# Patient Record
Sex: FEMALE | URBAN - METROPOLITAN AREA
[De-identification: names, ages, dates, MRNs, and addresses within clinical notes are randomized per-mention and may not be internally consistent; named-entity substitution may affect disease eponyms.]

---

## 2018-03-30 ENCOUNTER — APPOINTMENT (RX ONLY)
Dept: URBAN - METROPOLITAN AREA CLINIC 71 | Facility: CLINIC | Age: 73
Setting detail: DERMATOLOGY
End: 2018-03-30

## 2018-03-30 VITALS — HEIGHT: 65 IN | WEIGHT: 174 LBS

## 2018-03-30 DIAGNOSIS — B07.8 OTHER VIRAL WARTS: ICD-10-CM

## 2018-03-30 PROBLEM — C44.519 BASAL CELL CARCINOMA OF SKIN OF OTHER PART OF TRUNK: Status: ACTIVE | Noted: 2018-03-30

## 2018-03-30 PROBLEM — E13.9 OTHER SPECIFIED DIABETES MELLITUS WITHOUT COMPLICATIONS: Status: ACTIVE | Noted: 2018-03-30

## 2018-03-30 PROBLEM — F41.9 ANXIETY DISORDER, UNSPECIFIED: Status: ACTIVE | Noted: 2018-03-30

## 2018-03-30 PROBLEM — N18.9 CHRONIC KIDNEY DISEASE, UNSPECIFIED: Status: ACTIVE | Noted: 2018-03-30

## 2018-03-30 PROBLEM — E78.5 HYPERLIPIDEMIA, UNSPECIFIED: Status: ACTIVE | Noted: 2018-03-30

## 2018-03-30 PROBLEM — I10 ESSENTIAL (PRIMARY) HYPERTENSION: Status: ACTIVE | Noted: 2018-03-30

## 2018-03-30 PROBLEM — Z85.828 PERSONAL HISTORY OF OTHER MALIGNANT NEOPLASM OF SKIN: Status: ACTIVE | Noted: 2018-03-30

## 2018-03-30 PROCEDURE — ? LIQUID NITROGEN

## 2018-03-30 PROCEDURE — 12032 INTMD RPR S/A/T/EXT 2.6-7.5: CPT | Mod: 59

## 2018-03-30 PROCEDURE — ? EXCISION

## 2018-03-30 PROCEDURE — 11600 EXC TR-EXT MAL+MARG 0.5 CM/<: CPT

## 2018-03-30 PROCEDURE — ? COUNSELING

## 2018-03-30 PROCEDURE — 17110 DESTRUCTION B9 LES UP TO 14: CPT

## 2018-03-30 ASSESSMENT — LOCATION ZONE DERM: LOCATION ZONE: ARM

## 2018-03-30 ASSESSMENT — LOCATION DETAILED DESCRIPTION DERM: LOCATION DETAILED: LEFT PROXIMAL DORSAL FOREARM

## 2018-03-30 ASSESSMENT — LOCATION SIMPLE DESCRIPTION DERM: LOCATION SIMPLE: LEFT FOREARM

## 2018-03-30 NOTE — PROCEDURE: EXCISION
Body Location Override (Optional - Billing Will Still Be Based On Selected Body Map Location If Applicable): Right Shoulder

## 2018-04-20 ENCOUNTER — APPOINTMENT (RX ONLY)
Dept: URBAN - METROPOLITAN AREA CLINIC 71 | Facility: CLINIC | Age: 73
Setting detail: DERMATOLOGY
End: 2018-04-20

## 2018-04-20 DIAGNOSIS — B07.8 OTHER VIRAL WARTS: ICD-10-CM

## 2018-04-20 DIAGNOSIS — Z48.02 ENCOUNTER FOR REMOVAL OF SUTURES: ICD-10-CM

## 2018-04-20 PROBLEM — D04.39 CARCINOMA IN SITU OF SKIN OF OTHER PARTS OF FACE: Status: ACTIVE | Noted: 2018-04-20

## 2018-04-20 PROCEDURE — 17110 DESTRUCTION B9 LES UP TO 14: CPT

## 2018-04-20 PROCEDURE — 99024 POSTOP FOLLOW-UP VISIT: CPT

## 2018-04-20 PROCEDURE — ? SUTURE REMOVAL (GLOBAL PERIOD)

## 2018-04-20 PROCEDURE — ? COUNSELING

## 2018-04-20 PROCEDURE — ? LIQUID NITROGEN

## 2018-04-20 PROCEDURE — 99212 OFFICE O/P EST SF 10 MIN: CPT | Mod: 25

## 2018-04-20 PROCEDURE — ? PRESCRIPTION

## 2018-04-20 RX ORDER — IMIQUIMOD 50 MG/G
CREAM TOPICAL
Qty: 1 | Refills: 0 | Status: ERX | COMMUNITY
Start: 2018-04-20

## 2018-04-20 RX ADMIN — IMIQUIMOD SMALL AMOUNT: 50 CREAM TOPICAL at 00:00

## 2018-04-20 ASSESSMENT — LOCATION ZONE DERM: LOCATION ZONE: ARM

## 2018-04-20 ASSESSMENT — LOCATION SIMPLE DESCRIPTION DERM
LOCATION SIMPLE: RIGHT SHOULDER
LOCATION SIMPLE: LEFT FOREARM

## 2018-04-20 ASSESSMENT — LOCATION DETAILED DESCRIPTION DERM
LOCATION DETAILED: LEFT PROXIMAL DORSAL FOREARM
LOCATION DETAILED: RIGHT ANTERIOR SHOULDER

## 2018-04-20 NOTE — PROCEDURE: SUTURE REMOVAL (GLOBAL PERIOD)
Add 77308 Cpt? (Important Note: In 2017 The Use Of 00183 Is Being Tracked By Cms To Determine Future Global Period Reimbursement For Global Periods): yes
Body Location Override (Optional - Billing Will Still Be Based On Selected Body Map Location If Applicable): right shoulder
Detail Level: Detailed

## 2018-04-20 NOTE — HPI: FOLLOW-UP
What Is The Condition That You Are Returning For Follow-Up?: warts
When Was Your Last Appointment?: 03/30/2018
During The Previous Visit, The Patient....: Was treated with liquid nitrogen

## 2018-04-20 NOTE — PROCEDURE: LIQUID NITROGEN
Medical Necessity Clause: This procedure was medically necessary because the lesions that were treated were:
Duration Of Freeze Thaw-Cycle (Seconds): 15-20
Post-Care Instructions: I reviewed with the patient in detail post-care instructions. Patient is to wear sunprotection, and avoid picking at any of the treated lesions. Pt may apply Vaseline to crusted or scabbing areas.
Detail Level: Detailed
Add 52 Modifier (Optional): no
Number Of Freeze-Thaw Cycles: 1 freeze-thaw cycle
Consent: The patient's consent was obtained including but not limited to risks of crusting, scabbing, blistering, scarring, darker or lighter pigmentary change, recurrence, incomplete removal and infection.
Medical Necessity Information: It is in your best interest to select a reason for this procedure from the list below. All of these items fulfill various CMS LCD requirements except the new and changing color options.

## 2018-06-22 ENCOUNTER — APPOINTMENT (RX ONLY)
Dept: URBAN - METROPOLITAN AREA CLINIC 71 | Facility: CLINIC | Age: 73
Setting detail: DERMATOLOGY
End: 2018-06-22

## 2018-06-22 PROBLEM — D04.39 CARCINOMA IN SITU OF SKIN OF OTHER PARTS OF FACE: Status: ACTIVE | Noted: 2018-06-22

## 2018-06-22 PROCEDURE — ? COUNSELING

## 2018-06-22 PROCEDURE — ? ADDITIONAL NOTES

## 2018-06-22 PROCEDURE — 99212 OFFICE O/P EST SF 10 MIN: CPT

## 2018-06-22 NOTE — PROCEDURE: COUNSELING
Patient Specific Counseling (Will Not Stick From Patient To Patient): No longer appears to be in situ.
Detail Level: Detailed

## 2018-07-19 ENCOUNTER — APPOINTMENT (RX ONLY)
Dept: URBAN - METROPOLITAN AREA CLINIC 71 | Facility: CLINIC | Age: 73
Setting detail: DERMATOLOGY
End: 2018-07-19

## 2018-07-19 PROBLEM — C44.329 SQUAMOUS CELL CARCINOMA OF SKIN OF OTHER PARTS OF FACE: Status: ACTIVE | Noted: 2018-07-19

## 2018-07-19 PROCEDURE — ? MOHS SURGERY

## 2018-07-19 PROCEDURE — 17311 MOHS 1 STAGE H/N/HF/G: CPT

## 2018-07-19 PROCEDURE — 12052 INTMD RPR FACE/MM 2.6-5.0 CM: CPT

## 2018-07-26 ENCOUNTER — APPOINTMENT (RX ONLY)
Dept: URBAN - METROPOLITAN AREA CLINIC 71 | Facility: CLINIC | Age: 73
Setting detail: DERMATOLOGY
End: 2018-07-26

## 2018-07-26 DIAGNOSIS — Z48.02 ENCOUNTER FOR REMOVAL OF SUTURES: ICD-10-CM

## 2018-07-26 PROCEDURE — ? SUTURE REMOVAL (GLOBAL PERIOD)

## 2018-07-26 PROCEDURE — 99024 POSTOP FOLLOW-UP VISIT: CPT

## 2018-07-26 ASSESSMENT — LOCATION SIMPLE DESCRIPTION DERM: LOCATION SIMPLE: RIGHT CHEEK

## 2018-07-26 ASSESSMENT — LOCATION DETAILED DESCRIPTION DERM: LOCATION DETAILED: RIGHT MEDIAL MALAR CHEEK

## 2018-07-26 ASSESSMENT — LOCATION ZONE DERM: LOCATION ZONE: FACE

## 2018-07-26 NOTE — PROCEDURE: SUTURE REMOVAL (GLOBAL PERIOD)
Add 05145 Cpt? (Important Note: In 2017 The Use Of 67565 Is Being Tracked By Cms To Determine Future Global Period Reimbursement For Global Periods): yes
Detail Level: Detailed

## 2021-04-27 NOTE — PROCEDURE: EXCISION
Yes Complex Repair And M Plasty Text: The defect edges were debeveled with a #15 scalpel blade.  The primary defect was closed partially with a complex linear closure.  Given the location of the remaining defect, shape of the defect and the proximity to free margins an M plasty was deemed most appropriate for complete closure of the defect.  Using a sterile surgical marker, an appropriate advancement flap was drawn incorporating the defect and placing the expected incisions within the relaxed skin tension lines where possible.    The area thus outlined was incised deep to adipose tissue with a #15 scalpel blade.  The skin margins were undermined to an appropriate distance in all directions utilizing iris scissors.

## 2024-04-12 NOTE — PROCEDURE: MOHS SURGERY
The ABCs of the Annual Wellness Visit  Subsequent Medicare Wellness Visit    Subjective    Lizette Frias is a 58 y.o. female who presents for a Subsequent Medicare Wellness Visit.    The following portions of the patient's history were reviewed and   updated as appropriate: allergies, current medications, past family history, past medical history, past social history, past surgical history, and problem list.    Compared to one year ago, the patient feels her physical   health is worse.    Compared to one year ago, the patient feels her mental   health is worse.    Recent Hospitalizations:  She was not admitted to the hospital during the last year.       Current Medical Providers:  Patient Care Team:  Lars Fermin MD as PCP - General (Family Medicine)  Ming Stroud MD as Consulting Physician (Endocrinology)  Bill Ventura III, MD as Cardiologist (Cardiology)  Carmita Nelson APRN as Nurse Practitioner (Obstetrics and Gynecology)  Isaiah Law MD as Consulting Physician (Pain Medicine)  Brielle Taylor MD as Consulting Physician (Gastroenterology)    Outpatient Medications Prior to Visit   Medication Sig Dispense Refill   • buPROPion XL (WELLBUTRIN XL) 300 MG 24 hr tablet TAKE 1 TABLET BY MOUTH EVERY DAY IN THE MORNING 90 tablet 0   • calcium carbonate-vitamin d 600-400 MG-UNIT per tablet Take 1 tablet by mouth Daily.     • Cholecalciferol 50 MCG (2000 UT) capsule Take 1 tablet by mouth Daily.     • [facility] Melatonin 5 MG tablet tablet TAKE 1 TABLET BY MOUTH EVERY DAY AT NIGHT 90 tablet 1   • denosumab (Prolia) 60 MG/ML solution prefilled syringe syringe Inject 1 mL under the skin into the appropriate area as directed.     • ibuprofen (ADVIL,MOTRIN) 600 MG tablet Take 1 tablet by mouth Every 6 (Six) Hours As Needed.     • levothyroxine (SYNTHROID, LEVOTHROID) 200 MCG tablet TAKE 1 TABLET BY MOUTH EVERY DAY IN THE MORNING 90 tablet 1   • Melatonin 3 MG capsule Take 1 capsule by mouth  every night at bedtime.     • mirtazapine (REMERON) 15 MG tablet Take 1 tablet by mouth Every Night.     • pantoprazole (PROTONIX) 40 MG EC tablet Take 1 tablet by mouth 2 (Two) Times a Day. 30 minutes prior to first meal and 30 minutes prior to last meal of the day 180 tablet 3   • pregabalin (LYRICA) 150 MG capsule Take 1 capsule by mouth 2 (Two) Times a Day. 60 capsule 2   • rOPINIRole (REQUIP) 2 MG tablet Take 1 tablet by mouth Every Night. 30 tablet 1   • tacrolimus (PROGRAF) 0.5 MG capsule Take 2 capsules by mouth 2 (Two) Times a Day.     • telmisartan (MICARDIS) 20 MG tablet TAKE 1 TABLET BY MOUTH EVERY DAY 90 tablet 1   • traZODone (DESYREL) 100 MG tablet Take 2 tablets by mouth Every Night. 180 tablet 1   • icosapent ethyl (Vascepa) 1 g capsule capsule Take 2 g by mouth 2 (Two) Times a Day With Meals.       No facility-administered medications prior to visit.       No opioid medication identified on active medication list. I have reviewed chart for other potential  high risk medication/s and harmful drug interactions in the elderly.        Aspirin is not on active medication list.  Aspirin use is not indicated based on review of current medical condition/s. Risk of harm outweighs potential benefits.  .    Patient Active Problem List   Diagnosis   • Menopause   • Personal history of malignant neoplasm of thyroid   • Renal impairment   • Vision impairment   • Other osteoporosis without current pathological fracture   • Osteoarthritis   • Meningioma   • Hyperlipidemia LDL goal <70   • Anxiety and depression   • S/P cholecystectomy   • Leukopenia   • Immunosuppression due to drug therapy   • Insomnia   • Sleep apnea   • Gout   • Cytokine release syndrome, grade 2   • Postoperative hypothyroidism   • Stage 2 chronic kidney disease   • Gastroesophageal reflux disease with esophagitis without hemorrhage   • Cerebrovascular accident (CVA)   • Osteoarthritis of left hip   • Vocal cord dysfunction   • Gastroparesis  "  • Liver transplant recipient   • Albuminuria   • Primary osteoarthritis of both feet   • Primary hypertension   • Coronary artery disease involving native coronary artery of native heart without angina pectoris   • Nonalcoholic steatohepatitis (MORRISON)   • Personal history of immunosuppression therapy   • High risk social situation   • Non-seasonal allergic rhinitis due to pollen   • Corneal irritation of left eye   • Tooth pain   • Plantar fasciitis   • Family history of cancer   • Chronic fatigue   • Positive KATIE (antinuclear antibody)   • Breast, fat necrosis   • Transaminitis   • Tetrahydrocannabinol (THC) use disorder, mild, abuse   • Bacterial sinusitis   • Groin strain   • Chronic right hip pain   • Alkaline phosphatase elevation   • Housing insecurity   • Chronic midline low back pain with bilateral sciatica   • Paget disease of bone   • Closed nondisplaced fracture of right acetabulum with routine healing   • Restless leg syndrome   • Sleep apnea, unspecified   • Anemia in chronic kidney disease   • Chronic mastoiditis   • Impacted cerumen   • Mixed conductive and sensorineural hearing loss, bilateral   • Otitis externa of right ear   • Restless legs syndrome   • Rheumatoid arthritis involving multiple sites with positive rheumatoid factor     Advance Care Planning   Advance Care Planning     Advance Directive is on file.  ACP discussion was held with the patient during this visit. Patient has an advance directive in EMR which is still valid.      Objective    Vitals:    04/12/24 0941   BP: 108/72   BP Location: Left arm   Patient Position: Sitting   Cuff Size: Adult   Pulse: 73   Resp: 16   Temp: 97.7 °F (36.5 °C)   TempSrc: Temporal   Weight: 64.8 kg (142 lb 12.8 oz)   PainSc:   8   PainLoc: Generalized     Estimated body mass index is 27 kg/m² as calculated from the following:    Height as of 3/26/24: 154.9 cm (60.98\").    Weight as of this encounter: 64.8 kg (142 lb 12.8 oz).           Does the patient " have evidence of cognitive impairment? No    Lab Results   Component Value Date    HGBA1C 4.8 2024    HGBA1C 4.8 2024        HEALTH RISK ASSESSMENT    Smoking Status:  Social History     Tobacco Use   Smoking Status Never   • Passive exposure: Never   Smokeless Tobacco Never     Alcohol Consumption:  Social History     Substance and Sexual Activity   Alcohol Use No     Fall Risk Screen:    ALIREZA Fall Risk Assessment has not been completed.    Depression Screenin/12/2024    10:18 AM   PHQ-2/PHQ-9 Depression Screening   Little Interest or Pleasure in Doing Things 1-->several days   Feeling Down, Depressed or Hopeless 1-->several days   PHQ-9: Brief Depression Severity Measure Score 2       Health Habits and Functional and Cognitive Screenin/12/2024    10:00 AM   Functional & Cognitive Status   Do you have difficulty preparing food and eating? No   Do you have difficulty bathing yourself, getting dressed or grooming yourself? No   Do you have difficulty using the toilet? No   Do you have difficulty moving around from place to place? No   Do you have trouble with steps or getting out of a bed or a chair? No   Current Diet Well Balanced Diet   Dental Exam Up to date   Eye Exam Not up to date   Exercise (times per week) 0 times per week   Current Exercises Include No Regular Exercise   Do you need help using the phone?  No   Are you deaf or do you have serious difficulty hearing?  Yes   Do you need help to go to places out of walking distance? No   Do you need help shopping? No   Do you need help preparing meals?  No   Do you need help with housework?  No   Do you need help with laundry? No   Do you need help taking your medications? No   Do you need help managing money? No   Do you ever drive or ride in a car without wearing a seat belt? No   Have you felt unusual stress, anger or loneliness in the last month? Yes   Who do you live with? Alone   If you need help, do you have trouble finding  someone available to you? Yes   Have you been bothered in the last four weeks by sexual problems? No   Do you have difficulty concentrating, remembering or making decisions? No       Age-appropriate Screening Schedule:  Refer to the list below for future screening recommendations based on patient's age, sex and/or medical conditions. Orders for these recommended tests are listed in the plan section. The patient has been provided with a written plan.    Health Maintenance   Topic Date Due   • ZOSTER VACCINE (1 of 2) Never done   • RSV Vaccine - Adults (1 - 1-dose 60+ series) Never done   • ANNUAL WELLNESS VISIT  02/24/2024   • URINE MICROALBUMIN  03/16/2024   • COLORECTAL CANCER SCREENING  09/02/2024   • COVID-19 Vaccine (6 - 2023-24 season) 07/02/2024 (Originally 9/7/2023)   • INFLUENZA VACCINE  08/01/2024   • HEMOGLOBIN A1C  10/12/2024   • BMI FOLLOWUP  01/23/2025   • LIPID PANEL  02/13/2025   • MAMMOGRAM  11/13/2025   • DXA SCAN  12/18/2025   • TDAP/TD VACCINES (2 - Td or Tdap) 08/29/2027   • HEPATITIS C SCREENING  Completed   • Hepatitis B  Completed   • Pneumococcal Vaccine 0-64  Discontinued   • DIABETIC FOOT EXAM  Discontinued   • PAP SMEAR  Discontinued   • DIABETIC EYE EXAM  Discontinued                  CMS Preventative Services Quick Reference  Risk Factors Identified During Encounter  Dental Screening Recommended  Vision Screening Recommended  The above risks/problems have been discussed with the patient.  Pertinent information has been shared with the patient in the After Visit Summary.  An After Visit Summary and PPPS were made available to the patient.    Follow Up:   Next Medicare Wellness visit to be scheduled in 1 year.       Additional E&M Note during same encounter follows:  Patient has multiple medical problems which are significant and separately identifiable that require additional work above and beyond the Medicare Wellness Visit.      Chief Complaint  Plantar Fasciitis (Fu/)    Subjective         HPI  History of Present Illness  The patient presents for evaluation of multiple medical concerns.    The patient, previously employed at [employer], has recently ceased employment due to severe pain in her feet and back, necessitating a shift to a three-hour shift one to two days a week. She received injections for plantar fasciitis, which unfortunately exacerbated her symptoms. She is contemplating a repeat injection upon her reduced work hours. A spinal procedure performed by Dr. Arellano, an interventional pain specialist, was performed yesterday. She reports feeling better today, although it is challenging to discern if her condition improves unless she is on her feet for extended periods at work. She has been utilizing orthotics and finds them beneficial. The pain management team has recommended laboratory tests and a drug screen.    The patient consulted her rheumatologist on Wednesday, who suggested the onset of rheumatoid arthritis. She has moderate degenerative arthritis in her hands, which are causing significant discomfort. The rheumatologist has proposed medication, but plans to consult with the transplant clinic first. The hand pain she experiences is constant and disrupts her sleep, impairing her ability to make a fist. Her rheumatoid factor is currently normal, albeit occasionally high.    The patient discontinued Lipitor due to side effects. She has been on a high dose of atorvastatin since her 20s and suspects it may have contributed to her liver condition.    The patient has discontinued her cardiology follow-up. She discontinued aspirin due to bleeding issues. She denies any leg swelling. She experienced chest pain and difficulty breathing in the past, which resolved with weight loss. She denies any history of COPD or emphysema. She has never been a smoker. She passed all pulmonary tests during her breathing issues. She has a history of congestive heart failure.    The patient expresses a  desire to be contacted by a friend, DILEEP Talavera, in the event of a medical emergency. She needs to update her advanced directive. She expresses a desire for CPR and ventilator use if her heart ceases, but is hesitant for long-term ventilation with brain death or chance of long-term care facility.    The patient does not have a dentist. She plans to visit [facility] due to worsening dry eyes. She has been using Restasis, which has not provided relief. She has a dermatologist and recently had her yearly checkup. She has a history of skin cancer. She denies feeling unsafe or being the victim of any abuse.    Immunizations:  Immunization History   Administered Date(s) Administered   • COVID-19 (MODERNA) Monovalent Original Booster 02/21/2022   • COVID-19 (PFIZER) BIVALENT 12+YRS 07/13/2023   • COVID-19 (PFIZER) Purple Cap Monovalent 03/04/2021, 03/30/2021, 09/15/2021   • Fluzone (or Fluarix & Flulaval for VFC) >6mos 08/29/2017, 12/06/2022, 12/06/2022, 10/13/2023   • Hep A, Unspecified 01/21/2019   • Hep B, Unspecified 12/05/2018   • Hepatitis B 03/16/2023   • Hepatitis B Adult/Adolescent IM 12/05/2018, 05/15/2023   • Pneumococcal Polysaccharide (PPSV23) 03/21/2015   • Tdap 08/29/2017       Colorectal Screening:   Up-To-Date   Last Completed Colonoscopy       This patient has no relevant Health Maintenance data.          Pap:  Up-To-Date   Last Completed Pap Smear            Discontinued - PAP SMEAR  Discontinued      09/11/2019  SCANNED - PAP SMEAR                   Mammogram:  Up-To-Date   Last Completed Mammogram            Ordered - MAMMOGRAM (Every 2 Years) Ordered on 4/12/2024 11/13/2023  Mammo Diagnostic Digital Tomosynthesis Left With CAD    10/04/2023  Mammo Screening Digital Tomosynthesis Bilateral With CAD    10/03/2022  Mammo Screening Digital Tomosynthesis Bilateral With CAD    04/12/2019  MAMMO DIAGNOSTIC DIGITAL TOMOSYNTHESIS BILATERAL W CAD    10/05/2018  MAMMO DIAGNOSTIC DIGITAL  TOMOSYNTHESIS LEFT W CAD    Only the first 5 history entries have been loaded, but more history exists.                     CT for Smoker (Age 50-80, 20 pk yr):  N/A  Bone Density/DEXA (Age 65 or high risk): DEXA scan to be completed at age 65  Hep C (Age 18-79 once):  previously negative  HIV (Age 15-65 once): previously negative  A1c: ordered  Lipid panel: ordered    Dermatology: established  Ophthalmologist: established  Dentist: ordered    Tobacco Use: Low Risk  (4/12/2024)    Patient History    • Smoking Tobacco Use: Never    • Smokeless Tobacco Use: Never    • Passive Exposure: Never       Social History     Substance and Sexual Activity   Alcohol Use No        Social History     Substance and Sexual Activity   Drug Use Never    Comment: Tried an edible last week.         Diet/Physical activity: counseled on 04/12/24    PHQ-2 Depression Screening  Little interest or pleasure in doing things? 1-->several days   Feeling down, depressed, or hopeless? 1-->several days   PHQ-2 Total Score 2     PHQ-9 Total Score: 2     Intimate partner violence: (Screen on initial visit, pregnant women, women with injuries, older adult with injury or evidence of neglect): no concerns  Violence can be a problem in many people's lives, so I now ask every patient about trauma or abuse they may have experienced in a relationship.  Stress/Safety - Do you feel safe in your relationship?  Afraid/Abused - Have you ever been in a relationship where you were threatened, hurt, or afraid?  Friend/Family - Are your friends aware you have been hurt?  Emergency Plan - Do you have a safe place to go and the resources you need in an emergency?    Osteoporosis: no concerns  Ost menopausal women < 65 with RF (advancing age, previous fracture, glucocorticoid therapy, parental hip fracture, low body weight, current cigarette smoking, excessive alcohol consumption, rheumatoid arthritis, secondary osteoporosis [hypogonadism/premature menopause,  malabsorption, chronic liver disease, IBD]).  All women 65 or older      Review of Systems   All other systems reviewed and are negative.      Objective   Vital Signs:  /72 (BP Location: Left arm, Patient Position: Sitting, Cuff Size: Adult)   Pulse 73   Temp 97.7 °F (36.5 °C) (Temporal)   Resp 16   Wt 64.8 kg (142 lb 12.8 oz)   BMI 27.00 kg/m²     Physical Exam  Vitals and nursing note reviewed.   Constitutional:       General: She is not in acute distress.     Appearance: Normal appearance. She is normal weight. She is not ill-appearing or toxic-appearing.   HENT:      Nose: No congestion or rhinorrhea.   Eyes:      General:         Right eye: No discharge.         Left eye: No discharge.      Conjunctiva/sclera: Conjunctivae normal.   Cardiovascular:      Rate and Rhythm: Normal rate and regular rhythm.      Heart sounds: Normal heart sounds. No murmur heard.     No friction rub.   Pulmonary:      Effort: Pulmonary effort is normal. No respiratory distress.      Breath sounds: Normal breath sounds. No wheezing or rhonchi.   Abdominal:      General: Abdomen is flat. Bowel sounds are normal. There is no distension.      Palpations: Abdomen is soft. There is no mass.      Tenderness: There is no abdominal tenderness. There is no guarding or rebound.   Musculoskeletal:      Cervical back: Normal range of motion.      Right lower leg: No edema.      Left lower leg: No edema.   Skin:     Findings: No lesion or rash.   Neurological:      General: No focal deficit present.      Mental Status: She is alert. Mental status is at baseline.      Coordination: Coordination normal.      Gait: Gait normal.   Psychiatric:         Mood and Affect: Mood normal.         Behavior: Behavior normal.         Thought Content: Thought content normal.         Judgment: Judgment normal.          The following data was reviewed by: Lars Fermin MD on 04/12/2024:  Common labs          1/18/2024    15:08 2/13/2024    07:43  4/12/2024    10:41   Common Labs   Glucose 92      BUN 27      Creatinine 0.67      Sodium 140      Potassium 4.6      Chloride 109      Calcium 9.3      Albumin 3.9      Total Bilirubin 0.6      Alkaline Phosphatase 142      AST (SGOT) 20      ALT (SGPT) 36      WBC  6.34        Hemoglobin  13.7        Hematocrit  41.6        Platelets  188        Hemoglobin A1C  4.8     4.8       Details          This result is from an external source.                        Assessment and Plan   Problem List Items Addressed This Visit       Other osteoporosis without current pathological fracture    Hyperlipidemia LDL goal <70    Relevant Medications    icosapent ethyl (Vascepa) 1 g capsule capsule    Other Relevant Orders    Lipid Panel    Liver transplant recipient    Relevant Orders    Comprehensive Metabolic Panel    Microalbumin / Creatinine Urine Ratio - Urine, Clean Catch    POC INR (Completed)    CBC Auto Differential    Coronary artery disease involving native coronary artery of native heart without angina pectoris    Overview     Failed ASA due to bleeding episodes         Plantar fasciitis    Relevant Orders    Ambulatory Referral to Podiatry    Rheumatoid arthritis involving multiple sites with positive rheumatoid factor    Relevant Orders    ToxAssure Flex 23, Ur -     Other Visit Diagnoses       Medicare annual wellness visit, subsequent    -  Primary    Relevant Orders    Code Status and Medical Interventions:    Healthcare maintenance        Encounter for vaccination        Relevant Medications    Zoster Vac Recomb Adjuvanted 50 MCG/0.5ML reconstituted suspension    RSVPreF3 Vac Recomb Adjuvanted (AREXVY) 120 MCG/0.5ML reconstituted suspension injection    COVID-19 mRNA Vaccine, Moderna, (Spikevax COVID-19 Vaccine) 100 MCG/0.5ML suspension    Overweight (BMI 25.0-29.9)        Relevant Orders    POC Glycosylated Hemoglobin (Hb A1C) (Completed)    Comprehensive Metabolic Panel    Microalbumin / Creatinine Urine  Ratio - Urine, Clean Catch    Screen for colon cancer        Relevant Orders    Cologuard - Stool, Per Rectum    Hyperglycemia        Relevant Orders    POC Glycosylated Hemoglobin (Hb A1C) (Completed)    High risk medication use        Relevant Orders    ToxAssure Flex 23, Ur -    Encounter for screening mammogram for malignant neoplasm of breast        Relevant Orders    Mammo Screening Digital Tomosynthesis Bilateral With CAD    Encounter for screening        Relevant Orders    Ambulatory Referral to Dentistry            Diagnoses and all orders for this visit:    1. Medicare annual wellness visit, subsequent (Primary)  -     Code Status and Medical Interventions:    2. Healthcare maintenance    3. Hyperlipidemia LDL goal <70  -     Lipid Panel; Future  -     Lipid Panel    4. Encounter for vaccination  -     Zoster Vac Recomb Adjuvanted 50 MCG/0.5ML reconstituted suspension; Inject 0.5 mL into the appropriate muscle as directed by prescriber 1 (One) Time for 1 dose.  Dispense: 1 each; Refill: 0  -     RSVPreF3 Vac Recomb Adjuvanted (AREXVY) 120 MCG/0.5ML reconstituted suspension injection; Inject 0.5 mL into the appropriate muscle as directed by prescriber 1 (One) Time for 1 dose.  Dispense: 0.5 mL; Refill: 0  -     COVID-19 mRNA Vaccine, Moderna, (Spikevax COVID-19 Vaccine) 100 MCG/0.5ML suspension; Inject 0.25 mL into the appropriate muscle as directed by prescriber 1 (One) Time for 1 dose.  Dispense: 0.25 mL; Refill: 0    5. Overweight (BMI 25.0-29.9)  -     POC Glycosylated Hemoglobin (Hb A1C); Future  -     Comprehensive Metabolic Panel; Future  -     Microalbumin / Creatinine Urine Ratio - Urine, Clean Catch; Future  -     Microalbumin / Creatinine Urine Ratio - Urine, Clean Catch  -     POC Glycosylated Hemoglobin (Hb A1C)  -     Cancel: Comprehensive Metabolic Panel    6. Liver transplant recipient  -     Comprehensive Metabolic Panel; Future  -     Microalbumin / Creatinine Urine Ratio - Urine, Clean  Catch; Future  -     POC INR; Future  -     CBC Auto Differential; Future  -     Microalbumin / Creatinine Urine Ratio - Urine, Clean Catch  -     POC INR  -     Cancel: Comprehensive Metabolic Panel  -     CBC Auto Differential    7. Screen for colon cancer  -     Cologuard - Stool, Per Rectum; Future    8. Plantar fasciitis  -     Ambulatory Referral to Podiatry    9. Rheumatoid arthritis involving multiple sites with positive rheumatoid factor  -     Cancel: ToxAssure Flex 23, Ur -; Future  -     ToxAssure Flex 23, Ur -; Future  -     ToxAssure Flex 23, Ur -    10. Coronary artery disease involving native coronary artery of native heart without angina pectoris    11. Hyperglycemia  -     POC Glycosylated Hemoglobin (Hb A1C); Future  -     POC Glycosylated Hemoglobin (Hb A1C)    12. High risk medication use  -     Cancel: ToxAssure Flex 23, Ur -; Future  -     ToxAssure Flex 23, Ur -; Future  -     ToxAssure Flex 23, Ur -    13. Encounter for screening mammogram for malignant neoplasm of breast  -     Mammo Screening Digital Tomosynthesis Bilateral With CAD; Future    14. Encounter for screening  -     Ambulatory Referral to Dentistry    15. Other osteoporosis without current pathological fracture  -     Comprehensive Metabolic Panel  -     Magnesium  -     Phosphorus  -     Cancel: Calcium  -     Cancel: Comprehensive Metabolic Panel  -     Cancel: Magnesium  -     Cancel: Phosphorus  -     Cancel: Calcium  -     Vitamin D 25 Hydroxy           Assessment & Plan  1. Plantar fasciitis.  A referral to podiatry has been initiated.    2. Rheumatoid arthritis.  The patient was informed that any immunomodulating medications may interfere with the management of her liver condition. The patient has been advised to apply over-the-counter Voltaren gel twice daily.    3. Health maintenance.  Laboratory tests, including INR and liver function, have been ordered. The patient will inform us if additional labs are required. A  Cologuard test has been ordered. A mammogram has been scheduled for 11/2024. A dental appointment has been scheduled.    4. Coronary artery disease.  The patient's coronary artery disease is well-managed. No additional echocardiograms are recommended at this time.    Follow-up  The patient is scheduled for a follow-up visit in 3 months, or sooner if necessary.    Healthcare Maintenance:  Counseling provided based on age appropriate USPSTF guidelines.       Lizette KASSANDRA Frias voices understanding and acceptance of this advice and will call back with any further questions or concerns. AVS with preventive healthcare tips printed for patient.     “Discussed risks/benefits to vaccination, reviewed components of the vaccine, discussed VIS, discussed informed consent, informed consent obtained. Patient/Parent was allowed to accept or refuse vaccine. Questions answered to satisfactory state of patient/Parent. We reviewed typical age appropriate and seasonally appropriate vaccinations. Reviewed immunization history and updated state vaccination form as needed. Patient was counseled on Tdap  Zoster    Follow Up:   Return in about 3 months (around 7/12/2024) for Recheck.      Lars Fermin MD  [facility]    Patient was given instructions and counseling regarding her condition or for health maintenance advice. Please see specific information pulled into the AVS if appropriate.            Xenograft Text: The defect edges were debeveled with a #15 scalpel blade.  Given the location of the defect, shape of the defect and the proximity to free margins a xenograft was deemed most appropriate.  The graft was then trimmed to fit the size of the defect.  The graft was then placed in the primary defect and oriented appropriately.